# Patient Record
Sex: MALE | Race: WHITE | Employment: OTHER | URBAN - METROPOLITAN AREA
[De-identification: names, ages, dates, MRNs, and addresses within clinical notes are randomized per-mention and may not be internally consistent; named-entity substitution may affect disease eponyms.]

---

## 2018-04-30 PROBLEM — K21.00 GASTROESOPHAGEAL REFLUX DISEASE WITH ESOPHAGITIS: Status: ACTIVE | Noted: 2018-04-30

## 2018-08-08 PROBLEM — J43.2 CENTRILOBULAR EMPHYSEMA (HCC): Chronic | Status: ACTIVE | Noted: 2018-08-08

## 2018-08-21 PROBLEM — M79.2 NEUROPATHIC PAIN: Status: ACTIVE | Noted: 2018-08-21

## 2018-08-24 ENCOUNTER — HOSPITAL ENCOUNTER (OUTPATIENT)
Dept: CT IMAGING | Age: 63
Discharge: HOME | End: 2018-08-24
Attending: INTERNAL MEDICINE
Payer: COMMERCIAL

## 2018-08-24 DIAGNOSIS — R91.1 PULMONARY NODULE: ICD-10-CM

## 2018-08-24 DIAGNOSIS — Z87.891 HISTORY OF SMOKING GREATER THAN 50 PACK YEARS: ICD-10-CM

## 2018-08-24 PROCEDURE — 71250 CT THORAX DX C-: CPT

## 2019-02-26 PROBLEM — J20.0 ACUTE BRONCHITIS DUE TO MYCOPLASMA PNEUMONIAE: Status: ACTIVE | Noted: 2019-02-26

## 2019-07-25 ENCOUNTER — HOSPITAL ENCOUNTER (OUTPATIENT)
Dept: CT IMAGING | Age: 64
Discharge: HOME | End: 2019-07-25
Attending: NURSE PRACTITIONER
Payer: MEDICARE

## 2019-07-25 DIAGNOSIS — R91.8 PULMONARY NODULES: ICD-10-CM

## 2019-07-25 PROCEDURE — 71250 CT THORAX DX C-: CPT

## 2020-02-04 ENCOUNTER — HOSPITAL ENCOUNTER (OUTPATIENT)
Dept: CT IMAGING | Age: 65
Discharge: HOME | End: 2020-02-04
Attending: NURSE PRACTITIONER

## 2020-02-04 DIAGNOSIS — R91.8 PULMONARY NODULES: ICD-10-CM

## 2020-02-04 NOTE — PROGRESS NOTES
Please let patient know that CT reveals that his previous nodules have not changed any in the past 2 years. He does have several new nodules in the bottom of left lung, the largest is 5 mm. I recommend follow-up CT of chest without contrast in 3 to 6 months if he agrees to this. Please arrange.   Thank you

## 2020-02-10 NOTE — PROGRESS NOTES
Pt was notified  CT reveals that his previous nodules have not changed any in the past 2 years. He does have several new nodules in the bottom of left lung, the largest is 5 mm. He was told Dirk Erickson recommend follow-up CT of chest without contrast in 3 to 6 months. Pt verbalized understanding. Order placed.

## 2020-08-10 ENCOUNTER — HOSPITAL ENCOUNTER (OUTPATIENT)
Dept: CT IMAGING | Age: 65
Discharge: HOME | End: 2020-08-10
Attending: NURSE PRACTITIONER
Payer: MEDICARE

## 2020-08-10 DIAGNOSIS — R91.1 PULMONARY NODULE: ICD-10-CM

## 2020-08-10 PROCEDURE — 71250 CT THORAX DX C-: CPT

## 2020-08-10 NOTE — PROGRESS NOTES
Please let patient know that some nodules are resolved, some have been stable for > 2 years, and there is one new nodule in the RUL that is 6 mm. Recommend CT of chest without contrast in 6-12 months. Please arrange if patient agrees. Thank you.

## 2021-02-15 ENCOUNTER — HOSPITAL ENCOUNTER (OUTPATIENT)
Dept: CT IMAGING | Age: 66
Discharge: HOME | End: 2021-02-15
Attending: NURSE PRACTITIONER
Payer: MEDICARE

## 2021-02-15 DIAGNOSIS — R91.8 PULMONARY NODULES: ICD-10-CM

## 2021-02-15 PROCEDURE — 71250 CT THORAX DX C-: CPT

## 2021-02-16 NOTE — PROGRESS NOTES
Nodules are stable to improved. Emphysema changes, unchanged. No further CTs needed for nodules. Message sent to patient via [address]

## 2022-03-18 PROBLEM — K21.00 GASTROESOPHAGEAL REFLUX DISEASE WITH ESOPHAGITIS: Status: ACTIVE | Noted: 2018-04-30

## 2022-03-19 PROBLEM — J43.2 CENTRILOBULAR EMPHYSEMA (HCC): Status: ACTIVE | Noted: 2018-08-08

## 2022-03-19 PROBLEM — M79.2 NEUROPATHIC PAIN: Status: ACTIVE | Noted: 2018-08-21

## 2022-03-19 PROBLEM — J20.0 ACUTE BRONCHITIS DUE TO MYCOPLASMA PNEUMONIAE: Status: ACTIVE | Noted: 2019-02-26

## 2022-09-19 NOTE — PROGRESS NOTES
Patient Name:  Alejandra Jenkins                             YOB: 1955  MRN: [id number]                                              Office Visit 9/20/2022    ASSESSMENT AND PLAN:  (Medical Decision Making)    Gene was seen today for copd. Diagnoses and all orders for this visit:    Chronic obstructive pulmonary disease, unspecified COPD type ([facility].)  -     DME - DURABLE MEDICAL EQUIPMENT  --has mild obstruction on spirometry with advanced emphysema changes on CT scan--likely has decreased DLCO. --cannot afford inhalers and uses samples when he travels. --stressed importance of using his nebulizer at least bid which he agrees that he will do. --use albuterol 2 puffs q 4 hours prn dyspnea or wheezing.  --NICK on room air for qualification purposes  --consider CPFTs in the future. Personal history of tobacco use  -     [place] VISIT TO DISCUSS LUNG CA SCREEN W LDCT  -     CT Lung Screen (Annual); Future  --Discussed with patient current guidelines for screening for lung cancer. Current recommendations are to obtain yearly screening LDCT yearly from age 49-80, or until smoke free for 15 years. Patient has 67 pack year history of cigarette smoking and currently smoke free since 2017. Discussed with patient risks and benefits of screening, including over-diagnosis, false positive rate, and total radiation exposure. Patient currently exhibits no signs or symptoms suggestive of lung cancer. Discussed with patient importance of compliance with yearly annual lung cancer screenings and willingness to undergo diagnosis and treatment if screening scan is positive. In addition, patient was counseled regarding remaining smoke free. Encounter for screening for cancer of respiratory organs  Comments:  [facility] [place] [facility]  Orders:  -     CT Lung Screen (Annual); Future    Pulmonary nodules  --stable on CT scan for over 2 years--however needs ongoing annual LDCT. See above.     Covid-19  --recent infection with residual cough and MOTA. Resume pulmonary meds as above. Hopefully this will continue to improve with time. No orders of the defined types were placed in this encounter. Procedures    [place] VISIT TO DISCUSS LUNG CA SCREEN W LDCT    DME - DURABLE MEDICAL EQUIPMENT     Please send out Overnight Oximetry on Room Air  Please Fax results to: [phone]    Please use [address]! Follow-up and Dispositions    Return in about 6 months (around 3/20/2023) for [place], COPD, spirometry, Arrive 15 minutes prior to appt time. Collaborating physician is Dr. Cesar Layton. Yaya Doty, ROSA, APRN - CNP    Total time for encounter on day of encounter was 48 minutes. This time includes chart prep, review of tests/procedures, review of other provider's notes, documentation and counseling patient regarding disease process and medications. _________________________________________________________________________    HISTORY OF PRESENT ILLNESS:    Mr. Chet Cueto is a 75 y.o. male who [facility]  COPD     The patient is a 59-year-old white male who is seen for follow-up of mild COPD. He has a history of lung nodules in the right lung base and right middle lobe. His last CT scan was 2/15/2021 which revealed stable RUL and RLL nodules, hiatal hernia, and emphysema. He has a 69-pack-year history of cigarette smoking, but quit smoking in 2017. Is accompanied by his wife today who reports that there has been some cough and worsening MOTA. Had Covid August, 2022. This did not require hospitalization or treatment. Gets short of breath with minimal exertion, going up one flight of stairs, or doing yard work. Is not using his nebulizer. Uses Spiriva only if he goes on vacation. REVIEW OF SYSTEMS: 10 point review of systems is negative except as reported in HPI. PHYSICAL EXAM: Body mass index is 25.63 kg/m².   Vitals:    09/20/22 0853   BP: 128/64 Pulse: 61   Resp: 17   Temp: 97 °F (36.1 °C)   SpO2: 96%   Weight: 179 lb (81.2 kg)   Height: 5' 10.08\" (1.78 m)         General:   Alert, cooperative, no distress, appears stated age. Eyes:   Conjunctivae/corneas clear. PERRL        Mouth/Throat:  Lips, mucosa, and tongue normal. Teeth and gums normal.        Lungs:     Breath sounds clear bilaterally. Heart:   Regular rate and rhythm, S1, S2 normal, no murmur, click, rub or gallop. Abdomen:    Soft, non-tender. Extremities:  Extremities normal, atraumatic, no cyanosis or edema. Skin:  Skin color normal. No rashes or lesions     Neurologic:  A&Ox3     DIAGNOSTIC TESTS:                                                                                    LABS: No results found for: WBC, HGB, HCT, PLT, TSH, IGE, NTPROBNP, GERI, ANCA, RF, ESR, CRP  Imaging: I performed an independent interpretation of the patient's images. CXR: No results found for this or any previous visit from the past 3650 days. CT Chest:   CT CHEST WO CONTRAST 02/15/2021    Narrative  CT without contrast:    History: Follow-up pulmonary nodule. Technique: Helically acquired images were obtained from the lung apices to the  domes of the diaphragms reconstructed at 1.25 and 5 mm thickness without  intravenous contrast.    Radiation dose reduction techniques were used for this study:  Our CT scanners  use one or all of the following: Automated exposure control, adjustment of the  mA and/or kVp according to patient's size, iterative reconstruction. Comparison: 08/20/2020, 02/04/2020, 11/10/2017    Findings: There is no pleural or pericardial effusion. There is a moderate-sized  hiatal hernia. Advanced emphysematous changes are present. Again noted within  the superior segment right lower lobe is a 7 mm nodule with retraction of the  adjacent major fissure, unchanged since at least August 2018.   A 6 mm nodule is  present within the right upper lobe adjacent to the minor 50-12.5 MG per tablet 1 tablet, Oral, DAILY    melatonin 5 MG TABS tablet Oral    meloxicam (MOBIC) 15 mg, Oral, DAILY    pantoprazole sodium (PROTONIX) 40 mg, DAILY    rosuvastatin (CRESTOR) 40 mg, Oral    Tiotropium Bromide Monohydrate (SPIRIVA RESPIMAT IN) Inhalation    traMADol (ULTRAM) 50 MG tablet 1-2 qid prn pain    traZODone (DESYREL) 100 mg, Oral

## 2022-09-20 ENCOUNTER — OFFICE VISIT (OUTPATIENT)
Dept: PULMONOLOGY | Age: 67
End: 2022-09-20
Payer: MEDICARE

## 2022-09-20 VITALS
SYSTOLIC BLOOD PRESSURE: 128 MMHG | DIASTOLIC BLOOD PRESSURE: 64 MMHG | WEIGHT: 179 LBS | TEMPERATURE: 97 F | OXYGEN SATURATION: 96 % | HEIGHT: 70 IN | BODY MASS INDEX: 25.62 KG/M2 | RESPIRATION RATE: 17 BRPM | HEART RATE: 61 BPM

## 2022-09-20 DIAGNOSIS — R91.8 PULMONARY NODULES: ICD-10-CM

## 2022-09-20 DIAGNOSIS — J44.9 CHRONIC OBSTRUCTIVE PULMONARY DISEASE, UNSPECIFIED COPD TYPE (HCC): Primary | ICD-10-CM

## 2022-09-20 DIAGNOSIS — U07.1 COVID-19: ICD-10-CM

## 2022-09-20 DIAGNOSIS — Z12.2 ENCOUNTER FOR SCREENING FOR CANCER OF RESPIRATORY ORGANS: ICD-10-CM

## 2022-09-20 DIAGNOSIS — Z87.891 PERSONAL HISTORY OF TOBACCO USE: ICD-10-CM

## 2022-09-20 PROBLEM — J20.0 ACUTE BRONCHITIS DUE TO MYCOPLASMA PNEUMONIAE: Status: RESOLVED | Noted: 2019-02-26 | Resolved: 2022-09-20

## 2022-09-20 PROCEDURE — G8427 DOCREV CUR MEDS BY ELIG CLIN: HCPCS | Performed by: NURSE PRACTITIONER

## 2022-09-20 PROCEDURE — G8417 CALC BMI ABV UP PARAM F/U: HCPCS | Performed by: NURSE PRACTITIONER

## 2022-09-20 PROCEDURE — 99214 OFFICE O/P EST MOD 30 MIN: CPT | Performed by: NURSE PRACTITIONER

## 2022-09-20 PROCEDURE — 1123F ACP DISCUSS/DSCN MKR DOCD: CPT | Performed by: NURSE PRACTITIONER

## 2022-09-20 PROCEDURE — 3017F COLORECTAL CA SCREEN DOC REV: CPT | Performed by: NURSE PRACTITIONER

## 2022-09-20 PROCEDURE — G0296 VISIT TO DETERM LDCT ELIG: HCPCS | Performed by: NURSE PRACTITIONER

## 2022-09-20 PROCEDURE — 3023F SPIROM DOC REV: CPT | Performed by: NURSE PRACTITIONER

## 2022-09-20 PROCEDURE — 1036F TOBACCO NON-USER: CPT | Performed by: NURSE PRACTITIONER

## 2022-09-20 RX ORDER — ALBUTEROL SULFATE 90 UG/1
2 AEROSOL, METERED RESPIRATORY (INHALATION) EVERY 4 HOURS PRN
Qty: 18 G | Status: CANCELLED | OUTPATIENT
Start: 2022-09-20

## 2022-09-20 NOTE — PATIENT INSTRUCTIONS
Online pulmonary rehab option:  [email or web address]      I have ordered screening CT of chest.  You should receive a call from scheduling within 2-3 business days. If you do not hear from them, please call [phone] to schedule your test.     I have ordered overnight oxygen test.  If you do not hear from a local oxygen company within 2 weeks, please let us know. What is lung cancer screening? Lung cancer screening is a way in which doctors check the lungs for early signs of cancer in people who have no symptoms of lung cancer. A low-dose CT scan uses much less radiation than a normal CT scan and shows a more detailed image of the lungs than a standard X-ray. The goal of lung cancer screening is to find cancer early, before it has a chance to grow, spread, or cause problems. One large study found that smokers who were screened with low-dose CT scans were less likely to die of lung cancer than those who were screened with standard X-ray. Below is a summary of the things you need to know regarding screening for lung cancer with low-dose computed tomography (LDCT). This is a screening program that involves routine annual screening with LDCT studies of the lung. The LDCTs are done using low-dose radiation that is not thought to increase your cancer risk. If you have other serious medical conditions (other cancers, congestive heart failure) that limit your life expectancy to less than 10 years, you should not undergo lung cancer screening with LDCT. The chance is 20%-60% that the LDCT result will show abnormalities. This would require additional testing which could include repeat imaging or even invasive procedures. Most (about 95%) of \"abnormal\" LDCT results are false in the sense that no lung cancer is ultimately found. Additionally, some (about 10%) of the cancers found would not affect your life expectancy, even if undetected and untreated.   If you are still smoking, the single most important thing that you can do to reduce your risk of dying of lung cancer is to quit. For this screening to be covered by Medicare and most other insurers, strict criteria must be met. If you do not meet these criteria, but still wish to undergo LDCT testing, you will be required to sign a waiver indicating your willingness to pay for the scan.

## 2022-10-03 DIAGNOSIS — J44.9 CHRONIC OBSTRUCTIVE PULMONARY DISEASE, UNSPECIFIED COPD TYPE (HCC): Primary | ICD-10-CM

## 2022-10-03 DIAGNOSIS — G47.33 OSA (OBSTRUCTIVE SLEEP APNEA): ICD-10-CM

## 2023-03-16 NOTE — PROGRESS NOTES
Enlarged liver     Lung nodules     Ruptured disc, cervical     Ruptured disc, thoracic     White matter disease         Tobacco Use      Smoking status: Former        Packs/day: 1.50        Years: 46.00        Pack years: 71        Types: Cigarettes        Quit date: 2017        Years since quittin.5      Smokeless tobacco: Never    No Known Allergies  Current Outpatient Medications   Medication Instructions    albuterol sulfate  (90 Base) MCG/ACT inhaler 2 puffs, EVERY 4 HOURS PRN    amLODIPine (NORVASC) 5 mg, DAILY    aspirin 81 MG EC tablet Oral, DAILY    budesonide (PULMICORT) 500 mcg, Inhalation, 2 TIMES DAILY    gabapentin (NEURONTIN) 300 mg, Oral, 3 TIMES DAILY    ipratropium (ATROVENT) 0.5 mg, Inhalation, EVERY 6 HOURS PRN    losartan-hydroCHLOROthiazide (HYZAAR) 50-12.5 MG per tablet 1 tablet, Oral, DAILY    melatonin 5 MG TABS tablet Oral    meloxicam (MOBIC) 15 mg, DAILY    pantoprazole sodium (PROTONIX) 40 mg, DAILY    rosuvastatin (CRESTOR) 40 mg, Oral    Tiotropium Bromide Monohydrate (SPIRIVA RESPIMAT IN) Inhalation    traMADol (ULTRAM) 50 MG tablet 1-2 qid prn pain    traZODone (DESYREL) 100 mg, Oral

## 2023-03-20 ENCOUNTER — OFFICE VISIT (OUTPATIENT)
Dept: PULMONOLOGY | Age: 68
End: 2023-03-20
Payer: MEDICARE

## 2023-03-20 VITALS
DIASTOLIC BLOOD PRESSURE: 82 MMHG | HEIGHT: 71 IN | SYSTOLIC BLOOD PRESSURE: 144 MMHG | HEART RATE: 94 BPM | TEMPERATURE: 96.8 F | RESPIRATION RATE: 18 BRPM | WEIGHT: 181.1 LBS | OXYGEN SATURATION: 97 % | BODY MASS INDEX: 25.35 KG/M2

## 2023-03-20 DIAGNOSIS — G47.34 NOCTURNAL HYPOXEMIA: ICD-10-CM

## 2023-03-20 DIAGNOSIS — R05.1 ACUTE COUGH: ICD-10-CM

## 2023-03-20 DIAGNOSIS — J43.2 CENTRILOBULAR EMPHYSEMA (HCC): ICD-10-CM

## 2023-03-20 DIAGNOSIS — R91.8 PULMONARY NODULES: ICD-10-CM

## 2023-03-20 DIAGNOSIS — Z87.891 PERSONAL HISTORY OF TOBACCO USE: ICD-10-CM

## 2023-03-20 DIAGNOSIS — J44.9 COPD, MILD (HCC): Primary | ICD-10-CM

## 2023-03-20 LAB
EXPIRATORY TIME: NORMAL
FEF 25-75% %PRED-PRE: NORMAL
FEF 25-75% PRED: NORMAL
FEF 25-75%-PRE: NORMAL
FEV1 %PRED-PRE: 86 %
FEV1 PRED: NORMAL
FEV1/FVC %PRED-PRE: NORMAL
FEV1/FVC PRED: NORMAL
FEV1/FVC: 62 %
FEV1: 2.97 L
FVC %PRED-PRE: 102 %
FVC PRED: NORMAL
FVC: 4.77 L
PEF %PRED-PRE: NORMAL
PEF PRED: NORMAL
PEF-PRE: NORMAL

## 2023-03-20 PROCEDURE — G8427 DOCREV CUR MEDS BY ELIG CLIN: HCPCS | Performed by: NURSE PRACTITIONER

## 2023-03-20 PROCEDURE — G8484 FLU IMMUNIZE NO ADMIN: HCPCS | Performed by: NURSE PRACTITIONER

## 2023-03-20 PROCEDURE — G8417 CALC BMI ABV UP PARAM F/U: HCPCS | Performed by: NURSE PRACTITIONER

## 2023-03-20 PROCEDURE — 94010 BREATHING CAPACITY TEST: CPT | Performed by: INTERNAL MEDICINE

## 2023-03-20 PROCEDURE — 1036F TOBACCO NON-USER: CPT | Performed by: NURSE PRACTITIONER

## 2023-03-20 PROCEDURE — 3077F SYST BP >= 140 MM HG: CPT | Performed by: NURSE PRACTITIONER

## 2023-03-20 PROCEDURE — 3023F SPIROM DOC REV: CPT | Performed by: NURSE PRACTITIONER

## 2023-03-20 PROCEDURE — 3017F COLORECTAL CA SCREEN DOC REV: CPT | Performed by: NURSE PRACTITIONER

## 2023-03-20 PROCEDURE — 99214 OFFICE O/P EST MOD 30 MIN: CPT | Performed by: NURSE PRACTITIONER

## 2023-03-20 PROCEDURE — 3079F DIAST BP 80-89 MM HG: CPT | Performed by: NURSE PRACTITIONER

## 2023-03-20 PROCEDURE — 1123F ACP DISCUSS/DSCN MKR DOCD: CPT | Performed by: NURSE PRACTITIONER

## 2023-03-20 ASSESSMENT — PULMONARY FUNCTION TESTS
FVC_PERCENT_PREDICTED_PRE: 102
FEV1_PERCENT_PREDICTED_PRE: 86
FEV1: 2.97
FEV1/FVC: 62
FVC: 4.77

## 2023-03-30 ENCOUNTER — HOSPITAL ENCOUNTER (OUTPATIENT)
Dept: CT IMAGING | Age: 68
Discharge: HOME | End: 2023-03-30
Payer: MEDICARE

## 2023-03-30 VITALS — HEIGHT: 71 IN | BODY MASS INDEX: 25.2 KG/M2 | WEIGHT: 180 LBS

## 2023-03-30 DIAGNOSIS — Z12.2 ENCOUNTER FOR SCREENING FOR CANCER OF RESPIRATORY ORGANS: ICD-10-CM

## 2023-03-30 DIAGNOSIS — Z87.891 PERSONAL HISTORY OF TOBACCO USE: ICD-10-CM

## 2023-03-30 PROCEDURE — 71271 CT THORAX LUNG CANCER SCR C-: CPT

## 2023-08-04 ENCOUNTER — TELEPHONE (OUTPATIENT)
Dept: PULMONOLOGY | Age: 68
End: 2023-08-04

## 2023-09-18 ENCOUNTER — APPOINTMENT (RX ONLY)
Dept: URBAN - NONMETROPOLITAN AREA CLINIC 1 | Facility: CLINIC | Age: 68
Setting detail: DERMATOLOGY
End: 2023-09-18

## 2023-09-18 DIAGNOSIS — L82.0 INFLAMED SEBORRHEIC KERATOSIS: ICD-10-CM | Status: INADEQUATELY CONTROLLED

## 2023-09-18 DIAGNOSIS — D69.2 OTHER NONTHROMBOCYTOPENIC PURPURA: ICD-10-CM | Status: STABLE

## 2023-09-18 PROCEDURE — ? REFERRAL CORRESPONDENCE

## 2023-09-18 PROCEDURE — 17110 DESTRUCTION B9 LES UP TO 14: CPT

## 2023-09-18 PROCEDURE — ? COUNSELING

## 2023-09-18 PROCEDURE — ? LIQUID NITROGEN

## 2023-09-18 PROCEDURE — 99202 OFFICE O/P NEW SF 15 MIN: CPT | Mod: 25

## 2023-09-18 PROCEDURE — ? TREATMENT REGIMEN

## 2023-09-18 ASSESSMENT — LOCATION DETAILED DESCRIPTION DERM
LOCATION DETAILED: LEFT SUPERIOR MEDIAL MALAR CHEEK
LOCATION DETAILED: RIGHT PROXIMAL DORSAL FOREARM
LOCATION DETAILED: LEFT PROXIMAL DORSAL FOREARM

## 2023-09-18 ASSESSMENT — LOCATION SIMPLE DESCRIPTION DERM
LOCATION SIMPLE: LEFT FOREARM
LOCATION SIMPLE: LEFT CHEEK
LOCATION SIMPLE: RIGHT FOREARM

## 2023-09-18 ASSESSMENT — LOCATION ZONE DERM
LOCATION ZONE: ARM
LOCATION ZONE: FACE

## 2023-09-18 NOTE — PROCEDURE: MIPS QUALITY
Quality 394b: Td/Tdap Immunizations For Adolescents: Patient had one tetanus, diphtheria toxoids and acellular pertussis vaccine (Tdap) on or between the patient's 10th and 13th birthdays.
Quality 226: Preventive Care And Screening: Tobacco Use: Screening And Cessation Intervention: Tobacco Screening not Performed
Quality 130: Documentation Of Current Medications In The Medical Record: Current Medications Documented
Detail Level: Detailed

## 2023-09-18 NOTE — PROCEDURE: LIQUID NITROGEN
Render Note In Bullet Format When Appropriate: No
Show Applicator Variable?: Yes
Medical Necessity Information: It is in your best interest to select a reason for this procedure from the list below. All of these items fulfill various CMS LCD requirements except the new and changing color options.
Consent: The patient's consent was obtained including but not limited to risks of crusting, scabbing, blistering, scarring, darker or lighter pigmentary change, recurrence, incomplete removal and infection.
Detail Level: Detailed
Medical Necessity Clause: This procedure was medically necessary because the lesions that were treated were:
Spray Paint Text: The liquid nitrogen was applied to the skin utilizing a spray paint frosting technique.
Post-Care Instructions: I reviewed with the patient in detail post-care instructions. Patient is to wear sunprotection, and avoid picking at any of the treated lesions. Pt may apply Vaseline to crusted or scabbing areas.

## 2023-09-24 NOTE — PROGRESS NOTES
Name:  Monta Mcardle  YOB: 1955   MRN: [id number]      Office Visit: 9/25/2023        ASSESSMENT AND PLAN:  (Medical Decision Making)    Impression: 70 y.o. male with recent increase in wheezing and mucus production. Covid and flu swabs negative on 9/22/23 at Urgent Care. COPD exacerbation  --complete Zpak. Will add Prednisone taper. --when he is over this illness, needs Prevnar-20, flu vaccine, and RSV vaccine this fall. --continue Pulmicort bid and Atrovent bid. Uses Spiriva when traveling.    - DME - DURABLE MEDICAL EQUIPMENT  - ipratropium (ATROVENT) 0.02 % nebulizer solution; Take 2.5 mLs by nebulization 2 times daily  Dispense: 2.5 mL; Refill: 11  - tiotropium (SPIRIVA RESPIMAT) 2.5 MCG/ACT AERS inhaler; Inhale 2 puffs into the lungs daily  Dispense: 1 each; Refill: 11  - respiratory syncytial vaccine, adjuvanted (AREXVY) 120 MCG/0.5ML injection; Inject 0.5 mLs into the muscle once for 1 dose  Dispense: 0.5 mL; Refill: 0    2. Pulmonary nodules  --5 mm nodule in RML, and 6 mm nodule in RLL-  Stable. 3. Nocturnal hypoxemia  --needs to be on nocturnal O2. Previous overnight oximetry was abnormal, but patient did not believe the accuracy of the results. He was supposed to have had a repeat overnight oximetry after last visit, but this did not occur. Patient request to have a wristband done for this testing. I will resend the order. 4. Personal history of tobacco use  --Discussed with patient current guidelines for screening for lung cancer. Current recommendations are to obtain yearly screening LDCT yearly from age 49-80, or until smoke free for 15 years. Patient has 69 pack year history of cigarette smoking and currently smoke-free since 2017. Discussed with patient risks and benefits of screening, including over-diagnosis, false positive rate, and total radiation exposure. Patient currently exhibits no signs or symptoms suggestive of lung cancer.   Discussed with

## 2023-09-25 ENCOUNTER — OFFICE VISIT (OUTPATIENT)
Dept: PULMONOLOGY | Age: 68
End: 2023-09-25
Payer: MEDICARE

## 2023-09-25 VITALS
RESPIRATION RATE: 18 BRPM | WEIGHT: 178 LBS | OXYGEN SATURATION: 97 % | HEART RATE: 65 BPM | SYSTOLIC BLOOD PRESSURE: 130 MMHG | TEMPERATURE: 97.9 F | HEIGHT: 70 IN | DIASTOLIC BLOOD PRESSURE: 76 MMHG | BODY MASS INDEX: 25.48 KG/M2

## 2023-09-25 DIAGNOSIS — Z87.891 PERSONAL HISTORY OF TOBACCO USE: ICD-10-CM

## 2023-09-25 DIAGNOSIS — J44.1 COPD EXACERBATION (HCC): Primary | ICD-10-CM

## 2023-09-25 DIAGNOSIS — Z71.85 IMMUNIZATION COUNSELING: ICD-10-CM

## 2023-09-25 DIAGNOSIS — R91.8 PULMONARY NODULES: ICD-10-CM

## 2023-09-25 DIAGNOSIS — G47.34 NOCTURNAL HYPOXEMIA: ICD-10-CM

## 2023-09-25 PROCEDURE — 3017F COLORECTAL CA SCREEN DOC REV: CPT | Performed by: NURSE PRACTITIONER

## 2023-09-25 PROCEDURE — 1036F TOBACCO NON-USER: CPT | Performed by: NURSE PRACTITIONER

## 2023-09-25 PROCEDURE — 1123F ACP DISCUSS/DSCN MKR DOCD: CPT | Performed by: NURSE PRACTITIONER

## 2023-09-25 PROCEDURE — G0296 VISIT TO DETERM LDCT ELIG: HCPCS | Performed by: NURSE PRACTITIONER

## 2023-09-25 PROCEDURE — 3075F SYST BP GE 130 - 139MM HG: CPT | Performed by: NURSE PRACTITIONER

## 2023-09-25 PROCEDURE — G8417 CALC BMI ABV UP PARAM F/U: HCPCS | Performed by: NURSE PRACTITIONER

## 2023-09-25 PROCEDURE — G8427 DOCREV CUR MEDS BY ELIG CLIN: HCPCS | Performed by: NURSE PRACTITIONER

## 2023-09-25 PROCEDURE — 99214 OFFICE O/P EST MOD 30 MIN: CPT | Performed by: NURSE PRACTITIONER

## 2023-09-25 PROCEDURE — 3023F SPIROM DOC REV: CPT | Performed by: NURSE PRACTITIONER

## 2023-09-25 PROCEDURE — 3078F DIAST BP <80 MM HG: CPT | Performed by: NURSE PRACTITIONER

## 2023-09-25 RX ORDER — PREDNISONE 20 MG/1
TABLET ORAL
Qty: 15 TABLET | Refills: 0 | Status: SHIPPED | OUTPATIENT
Start: 2023-09-25

## 2023-09-25 NOTE — PATIENT INSTRUCTIONS
people who have a high risk of lung cancer. But experts don't agree on what high risk means. Some say people age 48 or older with at least a 20-pack-year smoking history are high risk. Others say it's people age 54 or older with a 30-pack-year history. To see if you could benefit from screening, first find out if you are at high risk for lung cancer. Your doctor can help you decide your lung cancer risk. What are the risks of screening? CT screening for lung cancer isn't perfect. It can show an abnormal result when it turns out there wasn't any cancer. This is called a false-positive result. This means you may need more tests to make sure you don't have cancer. These tests can be harmful and cause a lot of worry. These tests may include more CT scans and invasive testing like a lung biopsy. In a biopsy, the doctor takes a sample of tissue from inside your lung so it can be looked at under a microscope. A biopsy is the only way to tell if you have lung cancer. If the biopsy finds cancer, you and your doctor will have to decide how or whether to treat it. Some lung cancers found on CT scans are harmless and would not have caused a problem if they had not been found through screening. But because doctors can't tell which ones will turn out to be harmless, most will be treated. This means that you may get treatment--including surgery, radiation, or chemotherapy--that you don't need. There is a risk of damage to cells or tissue from being exposed to radiation, including the small amounts used in CTs, X-rays, and other medical tests. Over time, exposure to radiation may cause cancer and other health problems. But in most cases, the risk of getting cancer from being exposed to small amounts of radiation is low. It's not a reason to avoid these tests for most people. What are the benefits of screening? Your scan may be normal (negative).   For some people who are at higher risk, screening lowers the chance of dying

## 2023-12-18 NOTE — RESULT ENCOUNTER NOTE
Please let patient know that NICK was abnormal.  Needs O2 at 3 lpm with sleep, O2 sat 77%.  Diagnosis COPD.  This was done outside of the 30 day window--he may need new face to face visit to get the oxygen--can be virtual.

## 2023-12-19 ENCOUNTER — TELEPHONE (OUTPATIENT)
Dept: PULMONOLOGY | Age: 68
End: 2023-12-19

## 2023-12-19 DIAGNOSIS — J44.1 COPD EXACERBATION (HCC): Primary | ICD-10-CM

## 2024-01-18 ENCOUNTER — PROCEDURE VISIT (OUTPATIENT)
Dept: NEUROLOGY | Age: 69
End: 2024-01-18

## 2024-01-18 VITALS — HEIGHT: 70 IN | WEIGHT: 187 LBS | OXYGEN SATURATION: 98 % | BODY MASS INDEX: 26.77 KG/M2 | HEART RATE: 57 BPM

## 2024-01-18 DIAGNOSIS — M54.50 CHRONIC LOW BACK PAIN, UNSPECIFIED BACK PAIN LATERALITY, UNSPECIFIED WHETHER SCIATICA PRESENT: ICD-10-CM

## 2024-01-18 DIAGNOSIS — R20.2 PARESTHESIA OF LEFT FOOT: Primary | ICD-10-CM

## 2024-01-18 DIAGNOSIS — G89.29 CHRONIC LOW BACK PAIN, UNSPECIFIED BACK PAIN LATERALITY, UNSPECIFIED WHETHER SCIATICA PRESENT: ICD-10-CM

## 2024-01-18 DIAGNOSIS — M79.2 NEUROPATHIC PAIN: ICD-10-CM

## 2024-01-18 NOTE — PROGRESS NOTES
EMG/Nerve Conduction Study Procedure Note  [address]    [address]  [place], [place]  [id number] [phone]      Hx:    Exam:     68 y.o.  M  W male  re  EMG complains LLE  foot pain poss neuritis..  No atrophy.  Good movement of ankles.  No Babinski.  No swelling or edema.  Ref::  Dr Key Whitley D.P.M.  Tech::  Asa Gee  Hgt:::   5'10\"           Summary               needle EMG of select lower extrem mm.. w CV.        Controlled environmental factors / EMG lab.  Temperature.   NCV : sensory segments:    Abnormal = although the timing is fairly normal of bilateral sural SCB = = the amplitude is markedly reduced.  Attenuated SNAP.  NCV plantar sensory segments:     Deferred.  NCV Motor MCV segments:     Normal bilateral peroneal bilateral tibial MCV.  F-wave studies:         Abnormal left tibial F waves appear to be prolonged but right tibial and bilateral peroneal F waves are normal.  H-REFLEX Studies:    Normal bilateral H-reflexes.   NEEDLE EMG:   Tested muscles::    Bilateral TA MG VL muscles = normal =Normal insertional activity and interference pattern/recruitment.  No fasciculations fibrillations positive sharp waves.  Normal MUP.  No BSS AP.  No giant MUP.  No myotonia.  No upper motor neuron sign.          INTERPRETATION:     ELECTROPHYSIOLOGIC FINDINGS SUGGESTIVE OF SOME ABNORMALITIES IN THE LOWER EXTREMITIES POSSIBLY DISTAL SENSORY NEUROPATHY OR OTHER EARLY NEUROPATHY OR POLYNEUROPATHY WITHOUT SIGNIFICANT AXONAL DENERVATION CHANGES BY NEEDLE TESTING.  EQUIVOCAL L4-5 ABNORMALITIES.  NO DEFINITIVE DENERVATION.  NO MYOPATHY MYOTONIA OR FASCICULATIONS.            CONCLUSION:      Only suggestive distal evidence for early sensory neuropathy.  No definitive denervation or radiculopathy identified.      Procedure Details:      Further clinical correlation is warranted.    Patient / wife made aware.                  Please Note::     Data and waveforms * filed under Procedure category [facility].

## 2024-03-26 ENCOUNTER — OFFICE VISIT (OUTPATIENT)
Dept: PULMONOLOGY | Age: 69
End: 2024-03-26
Payer: MEDICARE

## 2024-03-26 VITALS
SYSTOLIC BLOOD PRESSURE: 128 MMHG | WEIGHT: 179.6 LBS | TEMPERATURE: 97.5 F | DIASTOLIC BLOOD PRESSURE: 74 MMHG | HEART RATE: 67 BPM | BODY MASS INDEX: 25.71 KG/M2 | OXYGEN SATURATION: 95 % | RESPIRATION RATE: 18 BRPM | HEIGHT: 70 IN

## 2024-03-26 DIAGNOSIS — Z87.891 PERSONAL HISTORY OF TOBACCO USE: ICD-10-CM

## 2024-03-26 DIAGNOSIS — J44.9 COPD, MILD (HCC): Primary | ICD-10-CM

## 2024-03-26 DIAGNOSIS — G47.34 NOCTURNAL HYPOXEMIA: ICD-10-CM

## 2024-03-26 LAB
EXPIRATORY TIME: NORMAL
FEF 25-75% %PRED-PRE: NORMAL
FEF 25-75% PRED: NORMAL
FEF 25-75-PRE: NORMAL
FEV1 %PRED-PRE: 70 %
FEV1 PRED: 3.23 L
FEV1/FVC %PRED-PRE: NORMAL
FEV1/FVC PRED: NORMAL
FEV1/FVC: 64 %
FEV1: 2.27 L
FVC %PRED-PRE: 83 %
FVC PRED: 4.26 L
FVC: 3.56 L
PEF %PRED-PRE: NORMAL
PEF PRED: NORMAL
PEF-PRE: NORMAL

## 2024-03-26 PROCEDURE — 1123F ACP DISCUSS/DSCN MKR DOCD: CPT | Performed by: NURSE PRACTITIONER

## 2024-03-26 PROCEDURE — 3078F DIAST BP <80 MM HG: CPT | Performed by: NURSE PRACTITIONER

## 2024-03-26 PROCEDURE — 3017F COLORECTAL CA SCREEN DOC REV: CPT | Performed by: NURSE PRACTITIONER

## 2024-03-26 PROCEDURE — 3023F SPIROM DOC REV: CPT | Performed by: NURSE PRACTITIONER

## 2024-03-26 PROCEDURE — 1036F TOBACCO NON-USER: CPT | Performed by: NURSE PRACTITIONER

## 2024-03-26 PROCEDURE — 99214 OFFICE O/P EST MOD 30 MIN: CPT | Performed by: NURSE PRACTITIONER

## 2024-03-26 PROCEDURE — G8484 FLU IMMUNIZE NO ADMIN: HCPCS | Performed by: NURSE PRACTITIONER

## 2024-03-26 PROCEDURE — G8417 CALC BMI ABV UP PARAM F/U: HCPCS | Performed by: NURSE PRACTITIONER

## 2024-03-26 PROCEDURE — 3074F SYST BP LT 130 MM HG: CPT | Performed by: NURSE PRACTITIONER

## 2024-03-26 PROCEDURE — G8427 DOCREV CUR MEDS BY ELIG CLIN: HCPCS | Performed by: NURSE PRACTITIONER

## 2024-03-26 RX ORDER — ALBUTEROL SULFATE 90 UG/1
2 AEROSOL, METERED RESPIRATORY (INHALATION) EVERY 4 HOURS PRN
Qty: 18 G | Refills: 11 | Status: SHIPPED | OUTPATIENT
Start: 2024-03-26

## 2024-03-26 ASSESSMENT — PULMONARY FUNCTION TESTS
FVC_PREDICTED: 4.26
FVC: 3.56
FEV1: 2.27
FVC_PERCENT_PREDICTED_PRE: 83
FEV1_PREDICTED: 3.23
FEV1_PERCENT_PREDICTED_PRE: 70
FEV1/FVC: 64

## 2024-03-26 NOTE — PROGRESS NOTES
Influenza, FLUCELVAX, (age 6 mo+), MDCK, PF, 0.5mL 2018    Influenza, FLUZONE (age 65 y+), High Dose, 0.7mL 2022    Influenza, High Dose (Fluzone 65 yrs and older) 2020, 10/01/2021    Pneumococcal, PCV-13, PREVNAR 13, (age 6w+), IM, 0.5mL 2020    Pneumococcal, PPSV23, PNEUMOVAX 23, (age 2y+), SC/IM, 0.5mL 2021     Past Medical History:   Diagnosis Date    Arthritis     Basal Joint     COPD (chronic obstructive pulmonary disease) (HCC)     Enlarged liver     Lung nodules     Ruptured disc, cervical     Ruptured disc, thoracic     White matter disease         Tobacco Use      Smoking status: Former        Packs/day: 0.00        Years: 1.5 packs/day for 46.0 years (69.0 ttl pk-yrs)        Types: Cigarettes        Start date: 1971        Quit date: 2017        Years since quittin.5      Smokeless tobacco: Never    No Known Allergies  Current Outpatient Medications   Medication Instructions    albuterol sulfate HFA (PROVENTIL;VENTOLIN;PROAIR) 108 (90 Base) MCG/ACT inhaler 2 puffs, Inhalation, EVERY 4 HOURS PRN    amLODIPine (NORVASC) 5 mg, DAILY    aspirin 81 MG EC tablet Oral, DAILY    budesonide (PULMICORT) 500 mcg, Inhalation, 2 TIMES DAILY    gabapentin (NEURONTIN) 300 mg, Oral, 3 TIMES DAILY    ipratropium (ATROVENT) 0.5 mg, Nebulization, 2 TIMES DAILY    losartan-hydroCHLOROthiazide (HYZAAR) 50-12.5 MG per tablet 1 tablet, Oral, DAILY    melatonin 5 MG TABS tablet Oral    meloxicam (MOBIC) 15 mg, DAILY    OXYGEN Inhalation, 3 lpm qhs    pantoprazole sodium (PROTONIX) 40 mg, DAILY    rosuvastatin (CRESTOR) 40 mg, Oral    tiotropium (SPIRIVA RESPIMAT) 2.5 MCG/ACT AERS inhaler 2 puffs, Inhalation, DAILY RESP    traMADol (ULTRAM) 50 MG tablet 1-2 qid prn pain    traZODone (DESYREL) 100 mg, Oral

## 2024-03-26 NOTE — PATIENT INSTRUCTIONS
I have ordered screening CT of chest that is due now.  Please call [phone] to schedule your test.

## 2024-04-08 ENCOUNTER — HOSPITAL ENCOUNTER (OUTPATIENT)
Dept: CT IMAGING | Age: 69
Discharge: HOME | End: 2024-04-11
Payer: MEDICARE

## 2024-04-08 DIAGNOSIS — Z87.891 PERSONAL HISTORY OF TOBACCO USE: ICD-10-CM

## 2024-04-08 PROCEDURE — 71271 CT THORAX LUNG CANCER SCR C-: CPT

## 2024-09-26 ENCOUNTER — OFFICE VISIT (OUTPATIENT)
Age: 69
End: 2024-09-26

## 2024-09-26 VITALS
OXYGEN SATURATION: 96 % | BODY MASS INDEX: 26.3 KG/M2 | SYSTOLIC BLOOD PRESSURE: 126 MMHG | RESPIRATION RATE: 18 BRPM | HEIGHT: 70 IN | WEIGHT: 183.7 LBS | HEART RATE: 57 BPM | TEMPERATURE: 97.3 F | DIASTOLIC BLOOD PRESSURE: 70 MMHG

## 2024-09-26 DIAGNOSIS — J44.9 COPD, MILD (HCC): Primary | ICD-10-CM

## 2024-09-26 DIAGNOSIS — Z87.891 PERSONAL HISTORY OF TOBACCO USE: ICD-10-CM

## 2024-09-26 DIAGNOSIS — G47.34 NOCTURNAL HYPOXEMIA: ICD-10-CM

## 2025-03-27 ENCOUNTER — OFFICE VISIT (OUTPATIENT)
Age: 70
End: 2025-03-27
Payer: MEDICARE

## 2025-03-27 VITALS
HEART RATE: 70 BPM | DIASTOLIC BLOOD PRESSURE: 68 MMHG | WEIGHT: 179.1 LBS | BODY MASS INDEX: 25.64 KG/M2 | HEIGHT: 70 IN | SYSTOLIC BLOOD PRESSURE: 122 MMHG | OXYGEN SATURATION: 97 % | RESPIRATION RATE: 18 BRPM | TEMPERATURE: 97.5 F

## 2025-03-27 DIAGNOSIS — J44.9 COPD, MILD (HCC): Primary | ICD-10-CM

## 2025-03-27 DIAGNOSIS — Z87.891 PERSONAL HISTORY OF TOBACCO USE: ICD-10-CM

## 2025-03-27 DIAGNOSIS — G47.34 NOCTURNAL HYPOXEMIA: ICD-10-CM

## 2025-03-27 LAB
EXPIRATORY TIME: NORMAL
FEF 25-75% %PRED-PRE: NORMAL
FEF 25-75% PRED: NORMAL
FEF 25-75-PRE: NORMAL
FEV1 %PRED-PRE: 65 %
FEV1 PRED: 3.26 L
FEV1/FVC %PRED-PRE: NORMAL
FEV1/FVC PRED: NORMAL
FEV1/FVC: 64 %
FEV1: 2.1 L
FVC %PRED-PRE: 74 %
FVC PRED: 4.43 L
FVC: 3.28 L
PEF %PRED-PRE: NORMAL
PEF PRED: NORMAL
PEF-PRE: NORMAL

## 2025-03-27 PROCEDURE — 3078F DIAST BP <80 MM HG: CPT | Performed by: NURSE PRACTITIONER

## 2025-03-27 PROCEDURE — 3017F COLORECTAL CA SCREEN DOC REV: CPT | Performed by: NURSE PRACTITIONER

## 2025-03-27 PROCEDURE — 1160F RVW MEDS BY RX/DR IN RCRD: CPT | Performed by: NURSE PRACTITIONER

## 2025-03-27 PROCEDURE — 3074F SYST BP LT 130 MM HG: CPT | Performed by: NURSE PRACTITIONER

## 2025-03-27 PROCEDURE — G8417 CALC BMI ABV UP PARAM F/U: HCPCS | Performed by: NURSE PRACTITIONER

## 2025-03-27 PROCEDURE — G8427 DOCREV CUR MEDS BY ELIG CLIN: HCPCS | Performed by: NURSE PRACTITIONER

## 2025-03-27 PROCEDURE — 1126F AMNT PAIN NOTED NONE PRSNT: CPT | Performed by: NURSE PRACTITIONER

## 2025-03-27 PROCEDURE — 99214 OFFICE O/P EST MOD 30 MIN: CPT | Performed by: NURSE PRACTITIONER

## 2025-03-27 PROCEDURE — G0296 VISIT TO DETERM LDCT ELIG: HCPCS | Performed by: NURSE PRACTITIONER

## 2025-03-27 PROCEDURE — 3023F SPIROM DOC REV: CPT | Performed by: NURSE PRACTITIONER

## 2025-03-27 PROCEDURE — 1159F MED LIST DOCD IN RCRD: CPT | Performed by: NURSE PRACTITIONER

## 2025-03-27 PROCEDURE — 1123F ACP DISCUSS/DSCN MKR DOCD: CPT | Performed by: NURSE PRACTITIONER

## 2025-03-27 PROCEDURE — 1036F TOBACCO NON-USER: CPT | Performed by: NURSE PRACTITIONER

## 2025-03-27 RX ORDER — CYCLOBENZAPRINE HCL 10 MG
TABLET ORAL
COMMUNITY

## 2025-03-27 RX ORDER — LORATADINE 10 MG/1
10 TABLET ORAL DAILY
COMMUNITY
Start: 2025-03-03

## 2025-03-27 RX ORDER — ALBUTEROL SULFATE 90 UG/1
2 INHALANT RESPIRATORY (INHALATION) EVERY 4 HOURS PRN
Qty: 18 G | Refills: 11 | Status: SHIPPED | OUTPATIENT
Start: 2025-03-27

## 2025-03-27 RX ORDER — FLUTICASONE FUROATE, UMECLIDINIUM BROMIDE AND VILANTEROL TRIFENATATE 100; 62.5; 25 UG/1; UG/1; UG/1
1 POWDER RESPIRATORY (INHALATION) DAILY
Qty: 1 EACH | Refills: 11
Start: 2025-03-27

## 2025-03-27 ASSESSMENT — PULMONARY FUNCTION TESTS
FVC_PREDICTED: 4.43
FVC: 3.28
FEV1_PREDICTED: 3.26
FEV1_PERCENT_PREDICTED_PRE: 65
FEV1: 2.10
FVC_PERCENT_PREDICTED_PRE: 74
FEV1/FVC: 64

## 2025-03-27 NOTE — PROGRESS NOTES
Name:  Gray Craft  YOB: 1955   MRN: [id number]      Office Visit: 3/27/2025        [facility]     The patient (or guardian, if applicable) and other individuals in attendance with the patient were advised that Artificial Intelligence will be utilized during this visit to record, process the conversation to generate a clinical note, and support improvement of the AI technology. The patient (or guardian, if applicable) and other individuals in attendance at the appointment consented to the use of AI, including the recording.         Assessment & Plan (Medical Decision Making)      Impression: 69 y.o. male     Assessment & Plan  1. Chronic Obstructive Pulmonary Disease (COPD).  His COPD is classified as moderate, with pulmonary function tests showing stability compared to the previous year. He is currently using a nebulizer with budesonide and ipratropium due to cost considerations. A sample of Trelegy will be provided for him to use one puff daily during travel to avoid carrying the nebulizer. He is advised to rinse his mouth after using the nebulizer to prevent thrush. Will give him samples of Trelegy 100 to use one puff daily when he is traveling in lieu of his nebulizer.  - albuterol sulfate HFA (PROVENTIL;VENTOLIN;PROAIR) 108 (90 Base) MCG/ACT inhaler; Inhale 2 puffs into the lungs every 4 hours as needed for Shortness of Breath or Wheezing  Dispense: 18 g; Refill: 11  - Spirometry Without Bronchodilator    2. Nocturnal Hypoxemia.  He continues to use oxygen at 2 L/min during sleep, reporting benefit and compliance. He is advised to continue this regimen. The use of a mask is recommended when spending extended periods outdoors to mitigate the risk of exacerbated coughing and wheezing due to wildfire smoke and pollen.    3. History of Tobacco Use.  He has a 69-pack-year history of cigarette smoking but quit in 2017. An annual CT scan is recommended until he reaches the age of 80 or

## 2025-03-27 NOTE — PATIENT INSTRUCTIONS
I have ordered screening CT of chest.  You should receive a call from scheduling within the next 2 weeks.  If you do not hear from them, please call [phone] to schedule your test.        Learning About Lung Cancer Screening  What is screening for lung cancer?     Lung cancer screening is a way to find some lung cancers early, before a person has any symptoms of the cancer.  Lung cancer screening may help those who have the highest risk for lung cancer--people age 50 and older who are or were heavy smokers. For most people, who aren't at increased risk, screening for lung cancer probably isn't helpful.  Screening won't prevent cancer. And it may not find all lung cancers. Lung cancer screening may lower the risk of dying from lung cancer in a small number of people.  How is it done?  Lung cancer screening is done with a low-dose CT (computed tomography) scan. A CT scan uses X-rays, or radiation, to make detailed pictures of your body. Experts recommend that screening be done in medical centers that focus on finding and treating lung cancer.  Who is screening recommended for?  Lung cancer screening is recommended for people age 50 and older who are or were heavy smokers. That means people with a smoking history of at least 20 pack years. A pack year is a way to measure how heavy a smoker you are or were.  To figure out your pack years, multiply how many packs a day on average (assuming 20 cigarettes per pack) you have smoked by how many years you have smoked. For example:  If you smoked 1 pack a day for 20 years, that's 1 times 20. So you have a smoking history of 20 pack years.  If you smoked 2 packs a day for 10 years, that's 2 times 10. So you have a smoking history of 20 pack years.  Experts agree that screening is for people who have a high risk of lung cancer. But experts don't agree on what high risk means. Some say people age 50 or older with at least a 20-pack-year smoking history are high risk. Others

## 2025-04-22 ENCOUNTER — HOSPITAL ENCOUNTER (OUTPATIENT)
Dept: CT IMAGING | Age: 70
Discharge: HOME | End: 2025-04-25
Payer: MEDICARE

## 2025-04-22 DIAGNOSIS — Z87.891 PERSONAL HISTORY OF TOBACCO USE: ICD-10-CM

## 2025-04-22 PROCEDURE — 71271 CT THORAX LUNG CANCER SCR C-: CPT

## 2025-04-24 ENCOUNTER — RESULTS FOLLOW-UP (OUTPATIENT)
Dept: PULMONOLOGY | Age: 70
End: 2025-04-24

## 2025-04-24 NOTE — RESULT ENCOUNTER NOTE
Please let patient know that CT scan does not show any worrisome findings for lung cancer.  He has a stable nodule and stable emphysema changes.  We will discuss at next appointment.  I would recommend a follow-up CT scan of his chest in 1 year which we can arrange at his next appointment.  Thank you

## 2025-04-28 NOTE — TELEPHONE ENCOUNTER
----- Message from OPAL Diamond CNP sent at 4/24/2025  1:12 PM EDT -----  Please let patient know that CT scan does not show any worrisome findings for lung cancer.  He has a stable nodule and stable emphysema changes.  We will discuss at next appointment.  I would recommend a follow-up CT scan of his chest in 1 year which we can arrange at his next appointment.  Thank you